# Patient Record
Sex: FEMALE | Race: WHITE | NOT HISPANIC OR LATINO | Employment: UNEMPLOYED | URBAN - METROPOLITAN AREA
[De-identification: names, ages, dates, MRNs, and addresses within clinical notes are randomized per-mention and may not be internally consistent; named-entity substitution may affect disease eponyms.]

---

## 2019-07-08 ENCOUNTER — TELEPHONE (OUTPATIENT)
Dept: PRIMARY CARE CLINIC | Facility: CLINIC | Age: 49
End: 2019-07-08

## 2019-07-08 NOTE — TELEPHONE ENCOUNTER
----- Message from Kip Downs sent at 7/8/2019 12:01 PM CDT -----  Type:  Sooner Apoointment Request    Caller is requesting a sooner appointment.  Caller declined first available appointment listed below.  Caller will not accept being placed on the waitlist and is requesting a message be sent to doctor.    Name of Caller:  , Ronald Hernandez  When is the first available appointment?  07/15  Symptoms:  Establish care-Annual-yeast infection  Best Call Back Number:  [phone]  Additional Information:  Caller states that he would like the patient to be seen on Friday 07/12 if possible.    Please call to advise

## 2019-07-25 ENCOUNTER — OFFICE VISIT (OUTPATIENT)
Dept: OBSTETRICS AND GYNECOLOGY | Facility: CLINIC | Age: 49
End: 2019-07-25
Payer: COMMERCIAL

## 2019-07-25 VITALS
DIASTOLIC BLOOD PRESSURE: 74 MMHG | BODY MASS INDEX: 29.57 KG/M2 | WEIGHT: 160.69 LBS | SYSTOLIC BLOOD PRESSURE: 110 MMHG | HEIGHT: 62 IN

## 2019-07-25 DIAGNOSIS — Z11.3 SCREEN FOR STD (SEXUALLY TRANSMITTED DISEASE): ICD-10-CM

## 2019-07-25 DIAGNOSIS — Z12.31 ENCOUNTER FOR SCREENING MAMMOGRAM FOR MALIGNANT NEOPLASM OF BREAST: ICD-10-CM

## 2019-07-25 DIAGNOSIS — N92.6 IRREGULAR MENSTRUAL CYCLE: ICD-10-CM

## 2019-07-25 DIAGNOSIS — Z01.419 ENCOUNTER FOR WELL WOMAN EXAM WITH ROUTINE GYNECOLOGICAL EXAM: Primary | ICD-10-CM

## 2019-07-25 LAB
C TRACH DNA SPEC QL NAA+PROBE: NOT DETECTED
N GONORRHOEA DNA SPEC QL NAA+PROBE: NOT DETECTED

## 2019-07-25 PROCEDURE — 99386 PR PREVENTIVE VISIT,NEW,40-64: ICD-10-PCS | Mod: S$GLB,,, | Performed by: NURSE PRACTITIONER

## 2019-07-25 PROCEDURE — 88141 LIQUID-BASED PAP SMEAR, SCREENING: ICD-10-PCS | Mod: ,,, | Performed by: PATHOLOGY

## 2019-07-25 PROCEDURE — 87801 DETECT AGNT MULT DNA AMPLI: CPT

## 2019-07-25 PROCEDURE — 88141 CYTOPATH C/V INTERPRET: CPT | Mod: ,,, | Performed by: PATHOLOGY

## 2019-07-25 PROCEDURE — 87624 HPV HI-RISK TYP POOLED RSLT: CPT

## 2019-07-25 PROCEDURE — 99999 PR PBB SHADOW E&M-EST. PATIENT-LVL III: ICD-10-PCS | Mod: PBBFAC,,, | Performed by: NURSE PRACTITIONER

## 2019-07-25 PROCEDURE — 88175 CYTOPATH C/V AUTO FLUID REDO: CPT | Performed by: PATHOLOGY

## 2019-07-25 PROCEDURE — 99386 PREV VISIT NEW AGE 40-64: CPT | Mod: S$GLB,,, | Performed by: NURSE PRACTITIONER

## 2019-07-25 PROCEDURE — 87491 CHLMYD TRACH DNA AMP PROBE: CPT

## 2019-07-25 PROCEDURE — 87661 TRICHOMONAS VAGINALIS AMPLIF: CPT

## 2019-07-25 PROCEDURE — 99999 PR PBB SHADOW E&M-EST. PATIENT-LVL III: CPT | Mod: PBBFAC,,, | Performed by: NURSE PRACTITIONER

## 2019-07-25 PROCEDURE — 87481 CANDIDA DNA AMP PROBE: CPT | Mod: 59

## 2019-07-25 RX ORDER — DEXTROAMPHETAMINE SACCHARATE, AMPHETAMINE ASPARTATE, DEXTROAMPHETAMINE SULFATE AND AMPHETAMINE SULFATE 7.5; 7.5; 7.5; 7.5 MG/1; MG/1; MG/1; MG/1
TABLET ORAL
COMMUNITY
Start: 2014-04-23

## 2019-07-26 ENCOUNTER — TELEPHONE (OUTPATIENT)
Dept: OBSTETRICS AND GYNECOLOGY | Facility: CLINIC | Age: 49
End: 2019-07-26

## 2019-07-26 NOTE — TELEPHONE ENCOUNTER
----- Message from Zac Watson sent at 7/26/2019  2:28 PM CDT -----  PLEASE CALL PT REGARDING HER LABS [phone]

## 2019-07-27 LAB
BACTERIAL VAGINOSIS DNA: POSITIVE
CANDIDA GLABRATA DNA: NEGATIVE
CANDIDA KRUSEI DNA: NEGATIVE
CANDIDA RRNA VAG QL PROBE: NEGATIVE
T VAGINALIS RRNA GENITAL QL PROBE: POSITIVE

## 2019-07-29 ENCOUNTER — TELEPHONE (OUTPATIENT)
Dept: OBSTETRICS AND GYNECOLOGY | Facility: CLINIC | Age: 49
End: 2019-07-29

## 2019-07-29 DIAGNOSIS — B96.89 BV (BACTERIAL VAGINOSIS): ICD-10-CM

## 2019-07-29 DIAGNOSIS — N76.0 BV (BACTERIAL VAGINOSIS): ICD-10-CM

## 2019-07-29 DIAGNOSIS — A59.9 TRICHIMONIASIS: Primary | ICD-10-CM

## 2019-07-29 RX ORDER — METRONIDAZOLE 500 MG/1
500 TABLET ORAL 2 TIMES DAILY
Qty: 14 TABLET | Refills: 1 | Status: SHIPPED | OUTPATIENT
Start: 2019-07-29 | End: 2019-08-05

## 2019-07-29 NOTE — TELEPHONE ENCOUNTER
Spoke with pt. Pt notified of results per Yesika Damian NP's message. Pt verbalized understanding.

## 2019-07-29 NOTE — TELEPHONE ENCOUNTER
----- Message from GILBERT Oneill sent at 7/29/2019  6:50 AM CDT -----  Please notify  pt she is + for trichomoniasis.  This is a STD. She is also positive for BV- not a STD.  Flagyl sent to pharmacy with refill for her partner.  Flagyl will treat both. Avoid alcohol while on the medication and for 24 hours after the last dose.  Notify all sexual partners within the past 60 days and to abstain from intercourse for 7 days after the treatment. Please schedule pt for JOSEFINA in 6-12 weeks.

## 2019-07-30 ENCOUNTER — TELEPHONE (OUTPATIENT)
Dept: OBSTETRICS AND GYNECOLOGY | Facility: CLINIC | Age: 49
End: 2019-07-30

## 2019-07-30 LAB
HPV HR 12 DNA CVX QL NAA+PROBE: NEGATIVE
HPV16 AG SPEC QL: NEGATIVE
HPV18 DNA SPEC QL NAA+PROBE: NEGATIVE

## 2019-07-30 NOTE — PROGRESS NOTES
CC: Annual    HPI: Pt is a 48 y.o.  Female  who presents for routine annual exam. She uses condoms for contraception. She does want STD screening. The patient does not smoke.   Pt denies any domestic violence. Reports irregular menstrual cycles since 3/2019. States cycles are becoming longer, she has skipped menses 2-3 times since 3/2019, and menses is lasting more days.   Last pap: unknown- no history of abnormal pap  Last mammogram:  unknown     FH:  Breast cancer: none  Colon cancer: none  Ovarian cancer: none  Endometrial cancer: none  Mother had hysterectomy in 20s for unknown reason    ROS:  GENERAL: Feeling well overall. Denies fever or chills.   SKIN: Denies rash or lesions.   HEAD: Denies head injury or headache.   NODES: Denies enlarged lymph nodes.   CHEST: Denies chest pain or shortness of breath.   CARDIOVASCULAR: Denies palpitations or left sided chest pain.   ABDOMEN: No abdominal pain, constipation, diarrhea, nausea, vomiting or rectal bleeding. Reports white particles in feces.   URINARY: No dysuria, hematuria, or frequency.  REPRODUCTIVE: Reports yellow, white vaginal discharge with itching and odor. Denies any lesions. Reports vaginal dryness.  BREASTS: Denies pain, lumps, or nipple discharge.   HEMATOLOGIC: No easy bruisability or excessive bleeding.   MUSCULOSKELETAL: Denies joint pain or swelling.   NEUROLOGIC: Denies syncope or weakness.   PSYCHIATRIC: Denies depression, anxiety or mood swings.    PE:   APPEARANCE: Well nourished, well developed, White female in no acute distress.  NODES: no cervical, supraclavicular, or inguinal lymphadenopathy  BREASTS: Symmetrical, no skin changes or visible lesions. No palpable masses, nipple discharge or adenopathy bilaterally.  ABDOMEN: Soft. No tenderness or masses. No distention. No hernias palpated. No CVA tenderness.  VULVA: No lesions. Normal external female genitalia.  URETHRAL MEATUS: Normal size and location, no lesions, no  prolapse.  URETHRA: No masses, tenderness, or prolapse.  VAGINA: Moist. No lesions or lacerations noted. No abnormal discharge present. No odor present.   CERVIX: No lesions or discharge. No cervical motion tenderness.   UTERUS: Normal size, regular shape, mobile, non-tender.  ADNEXA: No tenderness. No fullness or masses palpated in the adnexal regions.   ANUS PERINEUM: Normal.      Diagnosis:  1. Encounter for well woman exam with routine gynecological exam    2. Encounter for screening mammogram for malignant neoplasm of breast    3. Screen for STD (sexually transmitted disease)        Plan:     Orders Placed This Encounter    HPV High Risk Genotypes, PCR    C. trachomatis/N. gonorrhoeae by AMP DNA [facility]; Cervix    Vaginosis Screen by DNA Probe    HIV 1/2 Ag/Ab (4th Gen)    Hepatitis panel, acute    RPR    CBC auto differential    Comprehensive metabolic panel    Hemoglobin A1c    Lipid panel    TSH    Liquid-based pap smear, screening     Plan:  Pap/ hpv cotest  Mammogram  Std screening  Basic panel of age appropriate labs/ tsh   Refer to PCP for Well visit     Patient was counseled today on the new ACS guidelines for cervical cytology screening as well as the current recommendations for breast cancer screening. She was counseled to follow up with her PCP for other routine health maintenance. Counseling session lasted approximately 10 minutes, and all her questions were answered.    Follow-up with me in 1 year for routine exam

## 2019-07-30 NOTE — TELEPHONE ENCOUNTER
Called patient to discuss labs and need for F/U with PCP for management. No answer. Unable to leave message.

## 2019-08-05 ENCOUNTER — PATIENT MESSAGE (OUTPATIENT)
Dept: OBSTETRICS AND GYNECOLOGY | Facility: CLINIC | Age: 49
End: 2019-08-05

## 2019-08-28 ENCOUNTER — TELEPHONE (OUTPATIENT)
Dept: OBSTETRICS AND GYNECOLOGY | Facility: CLINIC | Age: 49
End: 2019-08-28

## 2019-09-11 ENCOUNTER — TELEPHONE (OUTPATIENT)
Dept: OBSTETRICS AND GYNECOLOGY | Facility: CLINIC | Age: 49
End: 2019-09-11

## 2019-09-11 ENCOUNTER — PATIENT MESSAGE (OUTPATIENT)
Dept: OBSTETRICS AND GYNECOLOGY | Facility: CLINIC | Age: 49
End: 2019-09-11

## 2019-09-11 NOTE — TELEPHONE ENCOUNTER
Attempted to call pt to reschedule appt due to Dr being out. No answer. Unable to leave message. Will attempt to call again at a later time.

## 2021-04-29 ENCOUNTER — PATIENT MESSAGE (OUTPATIENT)
Dept: RESEARCH | Facility: HOSPITAL | Age: 51
End: 2021-04-29